# Patient Record
Sex: FEMALE | Race: BLACK OR AFRICAN AMERICAN | NOT HISPANIC OR LATINO | URBAN - METROPOLITAN AREA
[De-identification: names, ages, dates, MRNs, and addresses within clinical notes are randomized per-mention and may not be internally consistent; named-entity substitution may affect disease eponyms.]

---

## 2020-11-01 ENCOUNTER — INPATIENT (INPATIENT)
Facility: HOSPITAL | Age: 60
LOS: 1 days | Discharge: HOME | End: 2020-11-03
Attending: INTERNAL MEDICINE | Admitting: INTERNAL MEDICINE
Payer: MEDICAID

## 2020-11-01 VITALS
RESPIRATION RATE: 16 BRPM | HEIGHT: 63 IN | HEART RATE: 88 BPM | OXYGEN SATURATION: 95 % | SYSTOLIC BLOOD PRESSURE: 143 MMHG | WEIGHT: 203.05 LBS | DIASTOLIC BLOOD PRESSURE: 59 MMHG | TEMPERATURE: 98 F

## 2020-11-01 DIAGNOSIS — Z29.9 ENCOUNTER FOR PROPHYLACTIC MEASURES, UNSPECIFIED: ICD-10-CM

## 2020-11-01 DIAGNOSIS — I10 ESSENTIAL (PRIMARY) HYPERTENSION: ICD-10-CM

## 2020-11-01 DIAGNOSIS — I25.10 ATHEROSCLEROTIC HEART DISEASE OF NATIVE CORONARY ARTERY WITHOUT ANGINA PECTORIS: ICD-10-CM

## 2020-11-01 DIAGNOSIS — I27.82 CHRONIC PULMONARY EMBOLISM: ICD-10-CM

## 2020-11-01 DIAGNOSIS — R10.9 UNSPECIFIED ABDOMINAL PAIN: ICD-10-CM

## 2020-11-01 DIAGNOSIS — M32.9 SYSTEMIC LUPUS ERYTHEMATOSUS, UNSPECIFIED: ICD-10-CM

## 2020-11-01 DIAGNOSIS — Z98.89 OTHER SPECIFIED POSTPROCEDURAL STATES: Chronic | ICD-10-CM

## 2020-11-01 LAB
ALBUMIN SERPL ELPH-MCNC: 3.2 G/DL — LOW (ref 3.3–5)
ALP SERPL-CCNC: 86 U/L — SIGNIFICANT CHANGE UP (ref 40–120)
ALT FLD-CCNC: 27 U/L — SIGNIFICANT CHANGE UP (ref 12–78)
ANION GAP SERPL CALC-SCNC: 6 MMOL/L — SIGNIFICANT CHANGE UP (ref 5–17)
APTT BLD: 43.1 SEC — HIGH (ref 27.5–35.5)
AST SERPL-CCNC: 17 U/L — SIGNIFICANT CHANGE UP (ref 15–37)
BASOPHILS # BLD AUTO: 0.03 K/UL — SIGNIFICANT CHANGE UP (ref 0–0.2)
BASOPHILS NFR BLD AUTO: 0.4 % — SIGNIFICANT CHANGE UP (ref 0–2)
BILIRUB SERPL-MCNC: 0.2 MG/DL — SIGNIFICANT CHANGE UP (ref 0.2–1.2)
BUN SERPL-MCNC: 10 MG/DL — SIGNIFICANT CHANGE UP (ref 7–23)
CALCIUM SERPL-MCNC: 9 MG/DL — SIGNIFICANT CHANGE UP (ref 8.5–10.1)
CHLORIDE SERPL-SCNC: 109 MMOL/L — HIGH (ref 96–108)
CO2 SERPL-SCNC: 25 MMOL/L — SIGNIFICANT CHANGE UP (ref 22–31)
CREAT SERPL-MCNC: 0.91 MG/DL — SIGNIFICANT CHANGE UP (ref 0.5–1.3)
D DIMER BLD IA.RAPID-MCNC: 262 NG/ML DDU — HIGH
EOSINOPHIL # BLD AUTO: 0.17 K/UL — SIGNIFICANT CHANGE UP (ref 0–0.5)
EOSINOPHIL NFR BLD AUTO: 2 % — SIGNIFICANT CHANGE UP (ref 0–6)
GLUCOSE SERPL-MCNC: 174 MG/DL — HIGH (ref 70–99)
HCT VFR BLD CALC: 39.6 % — SIGNIFICANT CHANGE UP (ref 34.5–45)
HGB BLD-MCNC: 12.8 G/DL — SIGNIFICANT CHANGE UP (ref 11.5–15.5)
IMM GRANULOCYTES NFR BLD AUTO: 0.2 % — SIGNIFICANT CHANGE UP (ref 0–1.5)
INR BLD: 1.11 RATIO — SIGNIFICANT CHANGE UP (ref 0.88–1.16)
LIDOCAIN IGE QN: 90 U/L — SIGNIFICANT CHANGE UP (ref 73–393)
LYMPHOCYTES # BLD AUTO: 2.61 K/UL — SIGNIFICANT CHANGE UP (ref 1–3.3)
LYMPHOCYTES # BLD AUTO: 31.3 % — SIGNIFICANT CHANGE UP (ref 13–44)
MAGNESIUM SERPL-MCNC: 2.1 MG/DL — SIGNIFICANT CHANGE UP (ref 1.6–2.6)
MCHC RBC-ENTMCNC: 26.3 PG — LOW (ref 27–34)
MCHC RBC-ENTMCNC: 32.3 GM/DL — SIGNIFICANT CHANGE UP (ref 32–36)
MCV RBC AUTO: 81.5 FL — SIGNIFICANT CHANGE UP (ref 80–100)
MONOCYTES # BLD AUTO: 0.54 K/UL — SIGNIFICANT CHANGE UP (ref 0–0.9)
MONOCYTES NFR BLD AUTO: 6.5 % — SIGNIFICANT CHANGE UP (ref 2–14)
NEUTROPHILS # BLD AUTO: 4.97 K/UL — SIGNIFICANT CHANGE UP (ref 1.8–7.4)
NEUTROPHILS NFR BLD AUTO: 59.6 % — SIGNIFICANT CHANGE UP (ref 43–77)
NRBC # BLD: 0 /100 WBCS — SIGNIFICANT CHANGE UP (ref 0–0)
PLATELET # BLD AUTO: 280 K/UL — SIGNIFICANT CHANGE UP (ref 150–400)
POTASSIUM SERPL-MCNC: 4 MMOL/L — SIGNIFICANT CHANGE UP (ref 3.5–5.3)
POTASSIUM SERPL-SCNC: 4 MMOL/L — SIGNIFICANT CHANGE UP (ref 3.5–5.3)
PROT SERPL-MCNC: 7.6 GM/DL — SIGNIFICANT CHANGE UP (ref 6–8.3)
PROTHROM AB SERPL-ACNC: 12.8 SEC — SIGNIFICANT CHANGE UP (ref 10.6–13.6)
RBC # BLD: 4.86 M/UL — SIGNIFICANT CHANGE UP (ref 3.8–5.2)
RBC # FLD: 14.1 % — SIGNIFICANT CHANGE UP (ref 10.3–14.5)
SARS-COV-2 RNA SPEC QL NAA+PROBE: SIGNIFICANT CHANGE UP
SODIUM SERPL-SCNC: 140 MMOL/L — SIGNIFICANT CHANGE UP (ref 135–145)
WBC # BLD: 8.34 K/UL — SIGNIFICANT CHANGE UP (ref 3.8–10.5)
WBC # FLD AUTO: 8.34 K/UL — SIGNIFICANT CHANGE UP (ref 3.8–10.5)

## 2020-11-01 PROCEDURE — 99222 1ST HOSP IP/OBS MODERATE 55: CPT

## 2020-11-01 PROCEDURE — 71045 X-RAY EXAM CHEST 1 VIEW: CPT | Mod: 26

## 2020-11-01 PROCEDURE — 99285 EMERGENCY DEPT VISIT HI MDM: CPT

## 2020-11-01 PROCEDURE — 74177 CT ABD & PELVIS W/CONTRAST: CPT | Mod: 26,MA

## 2020-11-01 RX ORDER — ATORVASTATIN CALCIUM 80 MG/1
1 TABLET, FILM COATED ORAL
Qty: 0 | Refills: 0 | DISCHARGE

## 2020-11-01 RX ORDER — TRAMADOL HYDROCHLORIDE 50 MG/1
50 TABLET ORAL EVERY 6 HOURS
Refills: 0 | Status: DISCONTINUED | OUTPATIENT
Start: 2020-11-01 | End: 2020-11-03

## 2020-11-01 RX ORDER — MORPHINE SULFATE 50 MG/1
2 CAPSULE, EXTENDED RELEASE ORAL EVERY 4 HOURS
Refills: 0 | Status: DISCONTINUED | OUTPATIENT
Start: 2020-11-01 | End: 2020-11-03

## 2020-11-01 RX ORDER — SENNA PLUS 8.6 MG/1
1 TABLET ORAL
Qty: 0 | Refills: 0 | DISCHARGE

## 2020-11-01 RX ORDER — LISINOPRIL 2.5 MG/1
5 TABLET ORAL DAILY
Refills: 0 | Status: DISCONTINUED | OUTPATIENT
Start: 2020-11-01 | End: 2020-11-03

## 2020-11-01 RX ORDER — DORZOLAMIDE HYDROCHLORIDE TIMOLOL MALEATE 20; 5 MG/ML; MG/ML
1 SOLUTION/ DROPS OPHTHALMIC
Qty: 0 | Refills: 0 | DISCHARGE

## 2020-11-01 RX ORDER — METOPROLOL TARTRATE 50 MG
25 TABLET ORAL DAILY
Refills: 0 | Status: DISCONTINUED | OUTPATIENT
Start: 2020-11-01 | End: 2020-11-03

## 2020-11-01 RX ORDER — PANTOPRAZOLE SODIUM 20 MG/1
1 TABLET, DELAYED RELEASE ORAL
Qty: 0 | Refills: 0 | DISCHARGE

## 2020-11-01 RX ORDER — SODIUM CHLORIDE 9 MG/ML
1000 INJECTION INTRAMUSCULAR; INTRAVENOUS; SUBCUTANEOUS
Refills: 0 | Status: COMPLETED | OUTPATIENT
Start: 2020-11-01 | End: 2020-11-01

## 2020-11-01 RX ORDER — CLOPIDOGREL BISULFATE 75 MG/1
1 TABLET, FILM COATED ORAL
Qty: 0 | Refills: 0 | DISCHARGE

## 2020-11-01 RX ORDER — SODIUM CHLORIDE 9 MG/ML
1000 INJECTION INTRAMUSCULAR; INTRAVENOUS; SUBCUTANEOUS ONCE
Refills: 0 | Status: COMPLETED | OUTPATIENT
Start: 2020-11-01 | End: 2020-11-01

## 2020-11-01 RX ORDER — HYDROXYCHLOROQUINE SULFATE 200 MG
200 TABLET ORAL
Refills: 0 | Status: DISCONTINUED | OUTPATIENT
Start: 2020-11-01 | End: 2020-11-03

## 2020-11-01 RX ORDER — HYDROXYCHLOROQUINE SULFATE 200 MG
1 TABLET ORAL
Qty: 0 | Refills: 0 | DISCHARGE

## 2020-11-01 RX ORDER — HYDROXYUREA 500 MG/1
500 CAPSULE ORAL ONCE
Refills: 0 | Status: DISCONTINUED | OUTPATIENT
Start: 2020-11-01 | End: 2020-11-01

## 2020-11-01 RX ORDER — MYCOPHENOLATE MOFETIL 250 MG/1
500 CAPSULE ORAL
Refills: 0 | Status: DISCONTINUED | OUTPATIENT
Start: 2020-11-01 | End: 2020-11-01

## 2020-11-01 RX ORDER — ENOXAPARIN SODIUM 100 MG/ML
90 INJECTION SUBCUTANEOUS
Refills: 0 | Status: DISCONTINUED | OUTPATIENT
Start: 2020-11-01 | End: 2020-11-03

## 2020-11-01 RX ORDER — MORPHINE SULFATE 50 MG/1
6 CAPSULE, EXTENDED RELEASE ORAL ONCE
Refills: 0 | Status: DISCONTINUED | OUTPATIENT
Start: 2020-11-01 | End: 2020-11-01

## 2020-11-01 RX ORDER — ONDANSETRON 8 MG/1
4 TABLET, FILM COATED ORAL EVERY 6 HOURS
Refills: 0 | Status: DISCONTINUED | OUTPATIENT
Start: 2020-11-01 | End: 2020-11-03

## 2020-11-01 RX ORDER — CLOPIDOGREL BISULFATE 75 MG/1
75 TABLET, FILM COATED ORAL DAILY
Refills: 0 | Status: DISCONTINUED | OUTPATIENT
Start: 2020-11-01 | End: 2020-11-02

## 2020-11-01 RX ORDER — ACETAMINOPHEN 500 MG
2 TABLET ORAL
Qty: 0 | Refills: 0 | DISCHARGE

## 2020-11-01 RX ORDER — MYCOPHENOLATE MOFETIL 250 MG/1
2 CAPSULE ORAL
Qty: 0 | Refills: 0 | DISCHARGE

## 2020-11-01 RX ORDER — SODIUM CHLORIDE 9 MG/ML
1000 INJECTION INTRAMUSCULAR; INTRAVENOUS; SUBCUTANEOUS
Refills: 0 | Status: DISCONTINUED | OUTPATIENT
Start: 2020-11-01 | End: 2020-11-01

## 2020-11-01 RX ORDER — SENNA PLUS 8.6 MG/1
1 TABLET ORAL AT BEDTIME
Refills: 0 | Status: DISCONTINUED | OUTPATIENT
Start: 2020-11-01 | End: 2020-11-03

## 2020-11-01 RX ORDER — METOPROLOL TARTRATE 50 MG
25 TABLET ORAL
Qty: 0 | Refills: 0 | DISCHARGE

## 2020-11-01 RX ORDER — ONDANSETRON 8 MG/1
4 TABLET, FILM COATED ORAL ONCE
Refills: 0 | Status: COMPLETED | OUTPATIENT
Start: 2020-11-01 | End: 2020-11-01

## 2020-11-01 RX ORDER — ATORVASTATIN CALCIUM 80 MG/1
80 TABLET, FILM COATED ORAL AT BEDTIME
Refills: 0 | Status: DISCONTINUED | OUTPATIENT
Start: 2020-11-01 | End: 2020-11-03

## 2020-11-01 RX ORDER — LATANOPROST 0.05 MG/ML
1 SOLUTION/ DROPS OPHTHALMIC; TOPICAL AT BEDTIME
Refills: 0 | Status: DISCONTINUED | OUTPATIENT
Start: 2020-11-01 | End: 2020-11-03

## 2020-11-01 RX ORDER — POLYETHYLENE GLYCOL 3350 17 G/17G
17 POWDER, FOR SOLUTION ORAL DAILY
Refills: 0 | Status: DISCONTINUED | OUTPATIENT
Start: 2020-11-01 | End: 2020-11-03

## 2020-11-01 RX ORDER — PANTOPRAZOLE SODIUM 20 MG/1
40 TABLET, DELAYED RELEASE ORAL
Refills: 0 | Status: DISCONTINUED | OUTPATIENT
Start: 2020-11-01 | End: 2020-11-03

## 2020-11-01 RX ORDER — KETOROLAC TROMETHAMINE 30 MG/ML
15 SYRINGE (ML) INJECTION EVERY 8 HOURS
Refills: 0 | Status: DISCONTINUED | OUTPATIENT
Start: 2020-11-01 | End: 2020-11-01

## 2020-11-01 RX ORDER — ENOXAPARIN SODIUM 100 MG/ML
90 INJECTION SUBCUTANEOUS
Qty: 0 | Refills: 0 | DISCHARGE

## 2020-11-01 RX ORDER — DORZOLAMIDE HYDROCHLORIDE TIMOLOL MALEATE 20; 5 MG/ML; MG/ML
1 SOLUTION/ DROPS OPHTHALMIC
Refills: 0 | Status: DISCONTINUED | OUTPATIENT
Start: 2020-11-01 | End: 2020-11-03

## 2020-11-01 RX ORDER — LISINOPRIL 2.5 MG/1
1 TABLET ORAL
Qty: 0 | Refills: 0 | DISCHARGE

## 2020-11-01 RX ORDER — MYCOPHENOLATE MOFETIL 250 MG/1
1000 CAPSULE ORAL
Refills: 0 | Status: DISCONTINUED | OUTPATIENT
Start: 2020-11-01 | End: 2020-11-03

## 2020-11-01 RX ADMIN — SENNA PLUS 1 TABLET(S): 8.6 TABLET ORAL at 23:22

## 2020-11-01 RX ADMIN — MORPHINE SULFATE 6 MILLIGRAM(S): 50 CAPSULE, EXTENDED RELEASE ORAL at 17:54

## 2020-11-01 RX ADMIN — MORPHINE SULFATE 6 MILLIGRAM(S): 50 CAPSULE, EXTENDED RELEASE ORAL at 18:15

## 2020-11-01 RX ADMIN — SODIUM CHLORIDE 1000 MILLILITER(S): 9 INJECTION INTRAMUSCULAR; INTRAVENOUS; SUBCUTANEOUS at 18:54

## 2020-11-01 RX ADMIN — ATORVASTATIN CALCIUM 80 MILLIGRAM(S): 80 TABLET, FILM COATED ORAL at 23:21

## 2020-11-01 RX ADMIN — SODIUM CHLORIDE 1000 MILLILITER(S): 9 INJECTION INTRAMUSCULAR; INTRAVENOUS; SUBCUTANEOUS at 17:54

## 2020-11-01 RX ADMIN — Medication 10 MILLIGRAM(S): at 23:22

## 2020-11-01 RX ADMIN — ONDANSETRON 4 MILLIGRAM(S): 8 TABLET, FILM COATED ORAL at 17:54

## 2020-11-01 RX ADMIN — SODIUM CHLORIDE 75 MILLILITER(S): 9 INJECTION INTRAMUSCULAR; INTRAVENOUS; SUBCUTANEOUS at 23:22

## 2020-11-01 RX ADMIN — LATANOPROST 1 DROP(S): 0.05 SOLUTION/ DROPS OPHTHALMIC; TOPICAL at 23:22

## 2020-11-01 NOTE — H&P ADULT - NSHPPHYSICALEXAM_GEN_ALL_CORE
PHYSICAL EXAM:    Vital Signs Last 24 Hrs  T(C): 36.6 (01 Nov 2020 17:28), Max: 36.6 (01 Nov 2020 17:28)  T(F): 97.9 (01 Nov 2020 17:28), Max: 97.9 (01 Nov 2020 17:28)  HR: 72 (01 Nov 2020 20:37) (72 - 88)  BP: 117/56 (01 Nov 2020 20:37) (117/56 - 143/59)  BP(mean): --  RR: 16 (01 Nov 2020 20:37) (16 - 16)  SpO2: 100% (01 Nov 2020 20:37) (95% - 100%)    GENERAL: Pt lying in bed comfortably in NAD  HEENT:  Atraumatic, EOMI, PERRL, conjunctiva and sclera clear, MMM  NECK: Supple, No JVD  CHEST/LUNG: Clear to auscultation bilaterally; No rales, rhonchi, wheezing or rubs. Unlabored respirations  HEART: Regular rate and rhythm; No murmurs, rubs, or gallops  ABDOMEN: Bowel sounds present; Soft, tender in the left abdomen, mildly distended.     EXTREMITIES:  2+ Peripheral Pulses, brisk capillary refill. No edema  NEUROLOGICAL:  Alert & Oriented X3, speech clear. Answers questions appropriately. Full and equal strength B/L upper and lower extremities. No deficits   MSK: FROM x 4 extremities   SKIN: No rashes or lesions

## 2020-11-01 NOTE — H&P ADULT - NSICDXPASTMEDICALHX_GEN_ALL_CORE_FT
PAST MEDICAL HISTORY:  Breast cancer     Coronary artery disease     DM (diabetes mellitus)     Hypertension     Lupus

## 2020-11-01 NOTE — H&P ADULT - PROBLEM SELECTOR PLAN 1
- although chronic, has been more severe and intractable  - pain control  - keep NPO for now, as pain exacerbation by food  - IVF  - CT scan w/ possible SVC thrombus, f/u MRI w/ IV contrast  - c/w PPI and bentyl  - IV morphine prn w/ continued laxatives - although chronic, has been more severe and intractable  - pain control  - keep NPO for now, as pain exacerbation by food, start clears in am and adv as tolerated  - IVF  - CT scan w/ possible SVC or central venous thrombus, f/u MRI w/ IV contrast in am  - c/w PPI and bentyl  - IV morphine prn w/ continued laxatives

## 2020-11-01 NOTE — H&P ADULT - NSHPLABSRESULTS_GEN_ALL_CORE
T(C): 36.6 (11-01-20 @ 17:28), Max: 36.6 (11-01-20 @ 17:28)  HR: 72 (11-01-20 @ 20:37) (72 - 88)  BP: 117/56 (11-01-20 @ 20:37) (117/56 - 143/59)  RR: 16 (11-01-20 @ 20:37) (16 - 16)  SpO2: 100% (11-01-20 @ 20:37) (95% - 100%)                        12.8   8.34  )-----------( 280      ( 01 Nov 2020 17:49 )             39.6     11-01    140  |  109<H>  |  10  ----------------------------<  174<H>  4.0   |  25  |  0.91    Ca    9.0      01 Nov 2020 17:49  Mg     2.1     11-01    TPro  7.6  /  Alb  3.2<L>  /  TBili  0.2  /  DBili  x   /  AST  17  /  ALT  27  /  AlkPhos  86  11-01    LIVER FUNCTIONS - ( 01 Nov 2020 17:49 )  Alb: 3.2 g/dL / Pro: 7.6 gm/dL / ALK PHOS: 86 U/L / ALT: 27 U/L / AST: 17 U/L / GGT: x           PT/INR - ( 01 Nov 2020 17:49 )   PT: 12.8 sec;   INR: 1.11 ratio         PTT - ( 01 Nov 2020 17:49 )  PTT:43.1 sec    < from: CT Abdomen and Pelvis w/ IV Cont (11.01.20 @ 18:34) >    IMPRESSION:  1.  Dilated azygos vein with extensive varices in the anterior abdominal wall may be secondary to SVC or central venous thrombosis, especially in this patient with history of breast cancer.. Consider nonemergent CT venogram of the chest for further evaluation.  2.  Fat-containing periumbilical hernia.  3.  Focal hepatic steatosis in segment 4 of the liver.    < end of copied text >    EKG - NSR at 76 bpm, no acute ischemic changes    ketorolac   Injectable 15 milliGRAM(s) IV Push every 8 hours PRN  morphine  - Injectable 2 milliGRAM(s) IV Push every 4 hours PRN  ondansetron Injectable 4 milliGRAM(s) IV Push every 6 hours PRN

## 2020-11-01 NOTE — H&P ADULT - HISTORY OF PRESENT ILLNESS
61 y/o female with PMHx of Lupus, HLD, CAD s/p MI w/ PCI and stent placement, Left Breast CA s/p Lumpectomy and Chemo 4 yrs ago, Recurrent PE on Lovenox subq presents to the ED c/o intermittent LUQ abdominal pain for the past few years. Pt reports pain initial began four years ago after her lumpectomy and has occurred intermittently since then. Pt reports pain has been intermittent in the past however in the last few weeks have been more constant. Pt reports pain at times worse after eating however can be worse without food. Pain located mainly in LUQ however does radiate down to LLQ and up her left chest to her shoulders. Pt describes pain as sharp, burning and 10/10 at max. Pt also reports constipation, abdominal bloating but denies n/v. Pt denies dysuria or urinary frequency, fever or chills. Pt reports multiple hospitalizations in past and extensive work up including scans as well as a colonoscopy were all unremarkable. Pt reports she was again referred to GI recently and is scheduled for an EGD on Thursday.     In ED Vitals stable, labs stable CT A/P w/ IV contrast Dilated azygos vein with extensive varices in the anterior abdominal wall may be secondary to SVC or central venous thrombosis, especially in this patient with history of breast cancer.

## 2020-11-01 NOTE — ED PROVIDER NOTE - OBJECTIVE STATEMENT
59 y/o F with pmhx lupus, HLD, hx of MI, breast CA, prediabetic presents c/o intermittent LUQ abdominal pain for past few years. Pt has been to multiple hospitals in past and they have not found a cause but she is scheduled for a EGD on Thursday. Pt says that today's episode happened after she was seated on toilet trying to pass a hard bowel movement. She denies N/V/D, CP, SOB, urinary symptoms, fevers.

## 2020-11-01 NOTE — ED PROVIDER NOTE - CLINICAL SUMMARY MEDICAL DECISION MAKING FREE TEXT BOX
severe abdominal pain r/o constipation vs diverticulitis vs SBO. severe abdominal pain r/o constipation vs diverticulitis vs SBO. ct shows possible svc or thrombosis. admit for pain control and mr of abd.

## 2020-11-01 NOTE — ED ADULT NURSE NOTE - OBJECTIVE STATEMENT
A&Ox4, ambulating. pt c/o left side abdominal pain x1 day, pain aggravated by having bowel movement today, sharp, pain 10/10, sudden onset with nausea, constant. pt denies falls/injuries. pt denies V/sob/cp/headache/dizziness. pt stated last BM was today and x1 yesterday, normal.

## 2020-11-01 NOTE — ED ADULT TRIAGE NOTE - CHIEF COMPLAINT QUOTE
59 y/o female with PMH OF HTN, GERD, STENT (2019), HLD, GLAUCOMA, SLE, INTERSTITIAL LUNG DISEASE, SLEEP APNEA. BIBA with c/c of abdominal pain that came on suddenly with nausea.

## 2020-11-01 NOTE — ED ADULT NURSE NOTE - CHIEF COMPLAINT QUOTE
61 y/o female with PMH OF HTN, GERD, STENT (2019), HLD, GLAUCOMA, SLE, INTERSTITIAL LUNG DISEASE, SLEEP APNEA. BIBA with c/c of abdominal pain that came on suddenly with nausea.

## 2020-11-01 NOTE — ED PROVIDER NOTE - PHYSICAL EXAMINATION
Gen: Alert, Pt is moaning and in agony.  Head: NC, AT   Eyes: PERRL, EOMI, normal lids/conjunctiva  ENT: normal hearing, patent oropharynx without erythema/exudate, uvula midline  Neck: supple, no tenderness, Trachea midline  Pulm: Bilateral BS, normal resp effort, no wheeze/stridor/retractions  CV: RRR, no M/R/G, 2+ radial and dp pulses bl, no edema  Abd: + obese abdomen, soft, NT/ND, +BS, no hepatosplenomegaly  Mskel: extremities x4 with normal ROM and no joint effusions. no ctl spine ttp.   Skin: no rash, no bruising   Neuro: AAOx3, no sensory/motor deficits, CN 2-12 intact

## 2020-11-01 NOTE — H&P ADULT - ASSESSMENT
59 y/o female with PMHx of Lupus, HLD, CAD s/p MI w/ PCI and stent placement, Left Breast CA s/p Lumpectomy and Chemo 4 yrs ago, Recurrent PE on Lovenox subq presents to the ED c/o intermittent LUQ abdominal pain for the past few years. Pt being admitted for intractable abdominal pain.    IMPROVE VTE Individual Risk Assessment    RISK                                                                Points    [  ] Previous VTE                                                  3    [  ] Thrombophilia                                               2    [  ] Lower limb paralysis                                      2        (unable to hold up >15 seconds)      [  ] Current Cancer                                              2         (within 6 months)    [  ] Immobilization > 24 hrs                                1    [  ] ICU/CCU stay > 24 hours                              1    [  ] Age > 60                                                      1    IMPROVE VTE Score ___3______    IMPROVE Score 0-1: Low Risk, No VTE prophylaxis required for most patients, encourage ambulation.   IMPROVE Score 2-3: At risk, pharmacologic VTE prophylaxis is indicated for most patients (in the absence of a contraindication)  IMPROVE Score > or = 4: High Risk, pharmacologic VTE prophylaxis is indicated for most patients (in the absence of a contraindication)

## 2020-11-01 NOTE — H&P ADULT - PROBLEM SELECTOR PLAN 2
- Pt reports she was previously on Coumadin for 10 yrs, then developed another clot 2 yrs ago thus was placed in Lovenox subq which has been on since then

## 2020-11-02 DIAGNOSIS — I87.1 COMPRESSION OF VEIN: ICD-10-CM

## 2020-11-02 LAB
HCV AB S/CO SERPL IA: 0.1 S/CO — SIGNIFICANT CHANGE UP (ref 0–0.99)
HCV AB SERPL-IMP: SIGNIFICANT CHANGE UP

## 2020-11-02 PROCEDURE — 99233 SBSQ HOSP IP/OBS HIGH 50: CPT

## 2020-11-02 PROCEDURE — 74183 MRI ABD W/O CNTR FLWD CNTR: CPT | Mod: 26

## 2020-11-02 RX ORDER — SIMETHICONE 80 MG/1
80 TABLET, CHEWABLE ORAL
Refills: 0 | Status: DISCONTINUED | OUTPATIENT
Start: 2020-11-02 | End: 2020-11-03

## 2020-11-02 RX ADMIN — Medication 10 MILLIGRAM(S): at 21:11

## 2020-11-02 RX ADMIN — ENOXAPARIN SODIUM 90 MILLIGRAM(S): 100 INJECTION SUBCUTANEOUS at 05:59

## 2020-11-02 RX ADMIN — Medication 1 DROP(S): at 23:30

## 2020-11-02 RX ADMIN — TRAMADOL HYDROCHLORIDE 50 MILLIGRAM(S): 50 TABLET ORAL at 21:15

## 2020-11-02 RX ADMIN — Medication 200 MILLIGRAM(S): at 06:00

## 2020-11-02 RX ADMIN — ENOXAPARIN SODIUM 90 MILLIGRAM(S): 100 INJECTION SUBCUTANEOUS at 17:27

## 2020-11-02 RX ADMIN — ATORVASTATIN CALCIUM 80 MILLIGRAM(S): 80 TABLET, FILM COATED ORAL at 21:10

## 2020-11-02 RX ADMIN — CLOPIDOGREL BISULFATE 75 MILLIGRAM(S): 75 TABLET, FILM COATED ORAL at 11:48

## 2020-11-02 RX ADMIN — MYCOPHENOLATE MOFETIL 1000 MILLIGRAM(S): 250 CAPSULE ORAL at 17:28

## 2020-11-02 RX ADMIN — DORZOLAMIDE HYDROCHLORIDE TIMOLOL MALEATE 1 DROP(S): 20; 5 SOLUTION/ DROPS OPHTHALMIC at 05:58

## 2020-11-02 RX ADMIN — Medication 10 MILLIGRAM(S): at 16:12

## 2020-11-02 RX ADMIN — DORZOLAMIDE HYDROCHLORIDE TIMOLOL MALEATE 1 DROP(S): 20; 5 SOLUTION/ DROPS OPHTHALMIC at 17:26

## 2020-11-02 RX ADMIN — Medication 200 MILLIGRAM(S): at 17:27

## 2020-11-02 RX ADMIN — TRAMADOL HYDROCHLORIDE 50 MILLIGRAM(S): 50 TABLET ORAL at 22:15

## 2020-11-02 RX ADMIN — MYCOPHENOLATE MOFETIL 1000 MILLIGRAM(S): 250 CAPSULE ORAL at 06:00

## 2020-11-02 RX ADMIN — Medication 25 MILLIGRAM(S): at 06:00

## 2020-11-02 RX ADMIN — TRAMADOL HYDROCHLORIDE 50 MILLIGRAM(S): 50 TABLET ORAL at 11:47

## 2020-11-02 RX ADMIN — LATANOPROST 1 DROP(S): 0.05 SOLUTION/ DROPS OPHTHALMIC; TOPICAL at 21:10

## 2020-11-02 RX ADMIN — SENNA PLUS 1 TABLET(S): 8.6 TABLET ORAL at 21:11

## 2020-11-02 RX ADMIN — Medication 10 MILLIGRAM(S): at 11:47

## 2020-11-02 RX ADMIN — TRAMADOL HYDROCHLORIDE 50 MILLIGRAM(S): 50 TABLET ORAL at 12:47

## 2020-11-02 RX ADMIN — Medication 1 DROP(S): at 06:01

## 2020-11-02 RX ADMIN — SIMETHICONE 80 MILLIGRAM(S): 80 TABLET, CHEWABLE ORAL at 17:28

## 2020-11-02 RX ADMIN — Medication 10 MILLIGRAM(S): at 08:10

## 2020-11-02 RX ADMIN — POLYETHYLENE GLYCOL 3350 17 GRAM(S): 17 POWDER, FOR SOLUTION ORAL at 11:48

## 2020-11-02 RX ADMIN — Medication 1 DROP(S): at 00:22

## 2020-11-02 RX ADMIN — LISINOPRIL 5 MILLIGRAM(S): 2.5 TABLET ORAL at 06:00

## 2020-11-02 RX ADMIN — Medication 1 DROP(S): at 17:26

## 2020-11-02 RX ADMIN — Medication 1 DROP(S): at 11:49

## 2020-11-02 RX ADMIN — PANTOPRAZOLE SODIUM 40 MILLIGRAM(S): 20 TABLET, DELAYED RELEASE ORAL at 08:10

## 2020-11-02 NOTE — CONSULT NOTE ADULT - SUBJECTIVE AND OBJECTIVE BOX
HPI:       59 y/o female with PMHx of Lupus, HLD, CAD s/p MI w/ PCI and stent placement, Left Breast CA s/p Lumpectomy and Chemo 4 yrs ago, Recurrent PE on Lovenox subq presents to the ED c/o intermittent LUQ abdominal pain for the past few years. Pt reports pain initial began four years ago after her lumpectomy and has occurred intermittently since then. Pt reports pain has been intermittent in the past however in the last few weeks have been more constant. Pt reports pain at times worse after eating however can be worse without food. Pain located mainly in LUQ however does radiate down to LLQ and up her left chest to her shoulders. Pt describes pain as sharp, burning and 10/10 at max. Pt also reports constipation, abdominal bloating but denies n/v. Pt denies dysuria or urinary frequency, fever or chills. Pt reports multiple hospitalizations in past and extensive work up including scans as well as a colonoscopy were all unremarkable. Pt reports she was again referred to GI recently and is scheduled for an EGD on Thursday.     In ED Vitals stable, labs stable CT A/P w/ IV contrast Dilated azygos vein with extensive varices in the anterior abdominal wall may be secondary to SVC or central venous thrombosis, especially in this patient with history of breast cancer. (01 Nov 2020 20:57)  ------------------ As Above -----------------------------------------------------------------  The patient presents with a LUQ pain for a very long time. The pain is best described as gassy / bloated. It seems that the food just sits there. It happens immediately after eating. The pain can also present without meals. Sitting up straight from a prone position or bending toward her left side causes the same pain. Finally, the pain sometimes radiates around from her left back  to her left upper quadrant. The patient states that despite different medications, she still has the pain. The pain can be constant and last for hours. It has not changed over the past few months. Constipated (+). She is on no food restrictions and does take lactose products. The patient had a " negative"  EGD and colonoscopy three years ago. The patient is scheduled for an EGD this Thursday. ( The patient is presently on Plavix and Lovenox BID )   See labs / CT scan    PAST MEDICAL & SURGICAL HISTORY:  Coronary artery disease    Hypertension    Lupus    Breast cancer    DM (diabetes mellitus)    H/O breast surgery        MEDICATIONS  (STANDING):  artificial tears (preservative free) Ophthalmic Solution 1 Drop(s) Both EYES four times a day  atorvastatin 80 milliGRAM(s) Oral at bedtime  clopidogrel Tablet 75 milliGRAM(s) Oral daily  dicyclomine 10 milliGRAM(s) Oral four times a day before meals  dorzolamide 2%/timolol 0.5% Ophthalmic Solution 1 Drop(s) Both EYES two times a day  enoxaparin Injectable 90 milliGRAM(s) SubCutaneous two times a day  hydroxychloroquine 200 milliGRAM(s) Oral two times a day  latanoprost 0.005% Ophthalmic Solution 1 Drop(s) Both EYES at bedtime  lisinopril 5 milliGRAM(s) Oral daily  metoprolol tartrate 25 milliGRAM(s) Oral daily  mycophenolate mofetil 1000 milliGRAM(s) Oral two times a day  pantoprazole    Tablet 40 milliGRAM(s) Oral before breakfast  polyethylene glycol 3350 17 Gram(s) Oral daily  senna 1 Tablet(s) Oral at bedtime    MEDICATIONS  (PRN):  morphine  - Injectable 2 milliGRAM(s) IV Push every 4 hours PRN Severe Pain (7 - 10)  ondansetron Injectable 4 milliGRAM(s) IV Push every 6 hours PRN Nausea and/or Vomiting  traMADol 50 milliGRAM(s) Oral every 6 hours PRN Moderate Pain (4 - 6)      Allergies    No Known Allergies    Intolerances        FAMILY HISTORY:  No pertinent family history in first degree relatives        REVIEW OF SYSTEMS:    CONSTITUTIONAL: No fever, weight loss, or fatigue  EYES: No eye pain, visual disturbances, or discharge  ENMT:  No difficulty hearing, tinnitus, vertigo; No sinus or throat pain  NECK: No pain or stiffness  BREASTS: No pain, masses, or nipple discharge  RESPIRATORY: No cough, wheezing, chills or hemoptysis; No shortness of breath  CARDIOVASCULAR: No chest pain, palpitations, dizziness, or leg swelling  GASTROINTESTINAL: See above  GENITOURINARY: No dysuria, frequency, hematuria, or incontinence  NEUROLOGICAL: No headaches, memory loss, loss of strength, numbness, or tremors  SKIN: No itching, burning, rashes, or lesions   LYMPH NODES: No enlarged glands  ENDOCRINE: No heat or cold intolerance; No hair loss  MUSCULOSKELETAL: No joint pain or swelling; No muscle, back, or extremity pain  PSYCHIATRIC: No depression, anxiety, mood swings, or difficulty sleeping  HEME/LYMPH: No easy bruising, or bleeding gums  ALLERGY AND IMMUNOLOGIC: No hives or eczema          SOCIAL HISTORY:    FAMILY HISTORY:  No pertinent family history in first degree relatives        Vital Signs Last 24 Hrs  T(C): 37.1 (02 Nov 2020 11:01), Max: 37.1 (02 Nov 2020 11:01)  T(F): 98.8 (02 Nov 2020 11:01), Max: 98.8 (02 Nov 2020 11:01)  HR: 67 (02 Nov 2020 11:01) (66 - 88)  BP: 143/73 (02 Nov 2020 11:01) (117/56 - 148/63)  BP(mean): --  RR: 18 (02 Nov 2020 11:01) (16 - 18)  SpO2: 99% (02 Nov 2020 11:01) (95% - 100%)    PHYSICAL EXAM:    GENERAL: NAD, well-groomed, well-developed  HEAD:  Atraumatic, Normocephalic  EYES: EOMI, PERRLA, conjunctiva and sclera clear  NECK: Supple, No JVD, Normal thyroid  NERVOUS SYSTEM:  Alert & Oriented X3, Good concentration; Motor Strength 5/5 B/L upper and lower extremities;   CHEST/LUNG: Clear to percussion bilaterally; No rales, rhonchi, wheezing, or rubs  HEART: Regular rate and rhythm; No murmurs, rubs, or gallops  ABDOMEN: Soft, ? tenderness on deep palpation, Nondistended; Bowel sounds present  EXTREMITIES:  2+ Peripheral Pulses, No clubbing, cyanosis, or edema  LYMPH: No lymphadenopathy noted   RECTAL: Deferred   SKIN: No rashes or lesions    LABS:                        12.8   8.34  )-----------( 280      ( 01 Nov 2020 17:49 )             39.6       CBC:  11-01 @ 17:49  WBC  8.34  HGB 12.8  HCT 39.6 Plate 280  MCV 81.5           01 Nov 2020 17:49    140    |  109    |  10     ----------------------------<  174    4.0     |  25     |  0.91     Ca    9.0        01 Nov 2020 17:49  Mg     2.1       01 Nov 2020 17:49    TPro  7.6    /  Alb  3.2    /  TBili  0.2    /  DBili  x      /  AST  17     /  ALT  27     /  AlkPhos  86     01 Nov 2020 17:49    PT/INR - ( 01 Nov 2020 17:49 )   PT: 12.8 sec;   INR: 1.11 ratio         PTT - ( 01 Nov 2020 17:49 )  PTT:43.1 sec        RADIOLOGY & ADDITIONAL STUDIES:  < from: CT Abdomen and Pelvis w/ IV Cont (11.01.20 @ 18:34) >    EXAM:  CT ABDOMEN AND PELVIS IC                            PROCEDURE DATE:  11/01/2020          INTERPRETATION:  CLINICAL INFORMATION: Severe abdominal pain    COMPARISON: April 12, 2016    PROCEDURE:  CT of the Abdomen and Pelvis was performed with intravenous contrast.  Intravenous contrast: 90 ml Omnipaque 350. 10 ml discarded.  Oral contrast: None.  Sagittal and coronal reformats were performed.    FINDINGS:  LOWER CHEST: Areas of groundglass opacity slightly more prominent than was seen onthe prior study are seen at both lung bases. Scarring is seen in the periphery of the RIGHT middle lobe with air cyst formation. Paraseptal emphysema is seen adjacent to the mediastinum on the LEFT.    LIVER: Asymmetric focal hepatic steatosis in segment 4 of the liver. This is similar to the prior study.  Prominent portal vein with recanalized periumbilical vein. Periumbilical subcutaneous varices throughout the anterior abdominal wall.  BILE DUCTS: Normal caliber.  GALLBLADDER: Within normal limits.  SPLEEN: Within normal limits.  PANCREAS: Within normal limits.  ADRENALS: Within normal limits.  KIDNEYS/URETERS: Within normal limits.    BLADDER: Within normal limits.  REPRODUCTIVE ORGANS: Normal uterus and adnexa.    BOWEL: No bowel obstruction. Appendix is normal.  Distended stomach with heterogeneous contents.  PERITONEUM: No ascites.  VESSELS: Dilated azygos vein in the lower chest and upper abdomen from the renal veins cephalad.  RETROPERITONEUM/LYMPH NODES: No lymphadenopathy.  ABDOMINAL WALL: Fat-containing periumbilical hernia. Numerous varices in the anterior abdominal wall.  BONES: Within normal limits.    IMPRESSION:  1.  Dilated azygos vein with extensive varices in the anterior abdominal wall may be secondary to SVC or central venous thrombosis, especially in this patient with history of breast cancer.. Consider nonemergent CT venogram of the chest for further evaluation.  2.  Fat-containing periumbilical hernia.  3.  Focal hepatic steatosis in segment 4 of the liver.            JOSE WILLIS MD; Attending Radiologist  This document has been electronically signed. Nov 1 2020  6:46PM    < end of copied text >

## 2020-11-02 NOTE — PROGRESS NOTE ADULT - PROBLEM SELECTOR PLAN 2
- Pt reports she was previously on Coumadin for 10 yrs, then developed another clot 2 yrs ago thus was placed in Lovenox subq which has been on since then   will continue

## 2020-11-02 NOTE — PATIENT PROFILE ADULT - VISION (WITH CORRECTIVE LENSES IF THE PATIENT USUALLY WEARS THEM):
pt has glaucoma/Partially impaired: cannot see medication labels or newsprint, but can see obstacles in path, and the surrounding layout; can count fingers at arm's length

## 2020-11-02 NOTE — CONSULT NOTE ADULT - ASSESSMENT
HPI:       59 y/o female with PMHx of Lupus, HLD, CAD s/p MI w/ PCI and stent placement, Left Breast CA s/p Lumpectomy and Chemo 4 yrs ago, Recurrent PE on Lovenox subq presents to the ED c/o intermittent LUQ abdominal pain for the past few years. Pt reports pain initial began four years ago after her lumpectomy and has occurred intermittently since then. Pt reports pain has been intermittent in the past however in the last few weeks have been more constant. Pt reports pain at times worse after eating however can be worse without food. Pain located mainly in LUQ however does radiate down to LLQ and up her left chest to her shoulders. Pt describes pain as sharp, burning and 10/10 at max. Pt also reports constipation, abdominal bloating but denies n/v. Pt denies dysuria or urinary frequency, fever or chills. Pt reports multiple hospitalizations in past and extensive work up including scans as well as a colonoscopy were all unremarkable. Pt reports she was again referred to GI recently and is scheduled for an EGD on Thursday.     In ED Vitals stable, labs stable CT A/P w/ IV contrast Dilated azygos vein with extensive varices in the anterior abdominal wall may be secondary to SVC or central venous thrombosis, especially in this patient with history of breast cancer. (01 Nov 2020 20:57)  ------------------ As Above -----------------------------------------------------------------  The patient presents with a LUQ pain for a very long time. The pain is best described as gassy / bloated. It seems that the food just sits there. It happens immediately after eating. The pain can also present without meals. Sitting up straight from a prone position or bending toward her left side causes the same pain. Finally, the pain sometimes radiates around from her left back  to her left upper quadrant. The patient states that despite different medications, she still has the pain. The pain can be constant and last for hours. It has not changed over the past few months. Constipated (+). She is on no food restrictions and does take lactose products. The patient had a " negative"  EGD and colonoscopy three years ago. The patient is scheduled for an EGD this Thursday. ( The patient is presently on Plavix and Lovenox BID )   See labs / CT scan    Abdominal pain - Probably has components of IBS and muscular skeletal origin. 1) EGD Thursday ( Can be [facility] as out patient - Hold Plavix if possible ) 2) Low fiber diet  3) lactose free diet 4) Simethicone 5) Pain management consult 6) ? PPI HPI:       59 y/o female with PMHx of Lupus, HLD, CAD s/p MI w/ PCI and stent placement, Left Breast CA s/p Lumpectomy and Chemo 4 yrs ago, Recurrent PE on Lovenox subq presents to the ED c/o intermittent LUQ abdominal pain for the past few years. Pt reports pain initial began four years ago after her lumpectomy and has occurred intermittently since then. Pt reports pain has been intermittent in the past however in the last few weeks have been more constant. Pt reports pain at times worse after eating however can be worse without food. Pain located mainly in LUQ however does radiate down to LLQ and up her left chest to her shoulders. Pt describes pain as sharp, burning and 10/10 at max. Pt also reports constipation, abdominal bloating but denies n/v. Pt denies dysuria or urinary frequency, fever or chills. Pt reports multiple hospitalizations in past and extensive work up including scans as well as a colonoscopy were all unremarkable. Pt reports she was again referred to GI recently and is scheduled for an EGD on Thursday.     In ED Vitals stable, labs stable CT A/P w/ IV contrast Dilated azygos vein with extensive varices in the anterior abdominal wall may be secondary to SVC or central venous thrombosis, especially in this patient with history of breast cancer. (01 Nov 2020 20:57)  ------------------ As Above -----------------------------------------------------------------  The patient presents with a LUQ pain for a very long time. The pain is best described as gassy / bloated. It seems that the food just sits there. It happens immediately after eating. The pain can also present without meals. Sitting up straight from a prone position or bending toward her left side causes the same pain. Finally, the pain sometimes radiates around from her left back  to her left upper quadrant. The patient states that despite different medications, she still has the pain. The pain can be constant and last for hours. It has not changed over the past few months. Constipated (+). She is on no food restrictions and does take lactose products. The patient had a " negative"  EGD and colonoscopy three years ago. The patient is scheduled for an EGD this Thursday. ( The patient is presently on Plavix and Lovenox BID )   See labs / CT scan    Abdominal pain - Probably has components of IBS and muscular skeletal origin. 1) EGD Thursday ( Can be [facility] as out patient - Hold Plavix if possible ) 2) Low fiber diet  3) lactose free diet 4) Simethicone 5) Pain management consult 6) ? PPI 7) clear liquid diet

## 2020-11-02 NOTE — PROGRESS NOTE ADULT - SUBJECTIVE AND OBJECTIVE BOX
Patient is a 60y old  Female who presents with a chief complaint of Intractable abdominal pain (01 Nov 2020 20:57)      OVERNIGHT EVENTS:      REVIEW OF SYSTEMS: denies chest pain/SOB, diaphoresis, no F/C, cough, dizziness, headache, blurry vision, nausea, vomiting, abdominal pain. Rest unremarkable     MEDICATIONS  (STANDING):  artificial tears (preservative free) Ophthalmic Solution 1 Drop(s) Both EYES four times a day  atorvastatin 80 milliGRAM(s) Oral at bedtime  clopidogrel Tablet 75 milliGRAM(s) Oral daily  dicyclomine 10 milliGRAM(s) Oral four times a day before meals  dorzolamide 2%/timolol 0.5% Ophthalmic Solution 1 Drop(s) Both EYES two times a day  enoxaparin Injectable 90 milliGRAM(s) SubCutaneous two times a day  hydroxychloroquine 200 milliGRAM(s) Oral two times a day  latanoprost 0.005% Ophthalmic Solution 1 Drop(s) Both EYES at bedtime  lisinopril 5 milliGRAM(s) Oral daily  metoprolol tartrate 25 milliGRAM(s) Oral daily  mycophenolate mofetil 1000 milliGRAM(s) Oral two times a day  pantoprazole    Tablet 40 milliGRAM(s) Oral before breakfast  polyethylene glycol 3350 17 Gram(s) Oral daily  senna 1 Tablet(s) Oral at bedtime    MEDICATIONS  (PRN):  morphine  - Injectable 2 milliGRAM(s) IV Push every 4 hours PRN Severe Pain (7 - 10)  ondansetron Injectable 4 milliGRAM(s) IV Push every 6 hours PRN Nausea and/or Vomiting  traMADol 50 milliGRAM(s) Oral every 6 hours PRN Moderate Pain (4 - 6)      Allergies    No Known Allergies    Intolerances        SUBJECTIVE: in bed in NAD, no acute events overnight     T(F): 98.2 (11-02-20 @ 06:18), Max: 98.3 (11-02-20 @ 00:27)  HR: 66 (11-02-20 @ 06:18) (66 - 88)  BP: 128/68 (11-02-20 @ 06:18) (117/56 - 148/63)  RR: 18 (11-02-20 @ 06:18) (16 - 18)  SpO2: 99% (11-02-20 @ 06:18) (95% - 100%)  Wt(kg): --    PHYSICAL EXAM:  GENERAL: NAD, well-groomed, well-developed  HEAD:  Atraumatic, Normocephalic  EYES: EOMI, PERRLA, conjunctiva and sclera clear  ENMT: No tonsillar erythema, exudates, or enlargement; Moist mucous membranes, Good dentition, No lesions  NECK: Supple, No JVD, Normal thyroid  CHEST/LUNG: Clear to  auscultation bilaterally; No rales, rhonchi, wheezing, or rubs  bilaterally  HEART: Regular rate and rhythm; No murmurs, rubs, or gallops  ABDOMEN: Soft, Nontender, Nondistended; Bowel sounds present  EXTREMITIES:  2+ Peripheral Pulses, No clubbing, cyanosis, or edema BL LE  LYMPH: No lymphadenopathy noted  SKIN: No rashes or lesions  NERVOUS SYSTEM:  Alert & Oriented X3, Good concentration; Motor Strength 5/5 B/L upper and lower extremities;   DTRs 2+ intact and symmetric, sensation intact BL    LABS:                        12.8   8.34  )-----------( 280      ( 01 Nov 2020 17:49 )             39.6     11-01    140  |  109<H>  |  10  ----------------------------<  174<H>  4.0   |  25  |  0.91    Ca    9.0      01 Nov 2020 17:49  Mg     2.1     11-01    TPro  7.6  /  Alb  3.2<L>  /  TBili  0.2  /  DBili  x   /  AST  17  /  ALT  27  /  AlkPhos  86  11-01    PT/INR - ( 01 Nov 2020 17:49 )   PT: 12.8 sec;   INR: 1.11 ratio         PTT - ( 01 Nov 2020 17:49 )  PTT:43.1 sec    Cultures;   CAPILLARY BLOOD GLUCOSE        Lipid panel:           RADIOLOGY & ADDITIONAL TESTS:      Imaging Personally Reviewed:  [ x] YES      Consultant(s) Notes Reviewed:  [x ] YES     Care Discussed with [x ] Consultants [X ] Patient [x ] Family  [x ]    [x ]  Other; RN

## 2020-11-02 NOTE — PROGRESS NOTE ADULT - PROBLEM SELECTOR PLAN 1
- although chronic, has been more severe and intractable recently per patient  - pain control  - keep NPO for now,  will get GI evaluation CT scan shows heterogenous content in stomach could be food blood or ?  PPI   - c/w PPI and bentyl  - IV morphine prn w/ continued laxatives

## 2020-11-02 NOTE — PROGRESS NOTE ADULT - PROBLEM SELECTOR PLAN 7
?svc obstruction given abnormal azgos with collaterals noted on ct scan . margarita get vascula consult .   unclear what more to be done as pt already on therapeutic doses of Lovenox for h/o PE

## 2020-11-02 NOTE — PROGRESS NOTE ADULT - ASSESSMENT
61 y/o female with PMHx of Lupus, HLD, CAD s/p MI w/ PCI and stent placement, Left Breast CA s/p Lumpectomy and Chemo 4 yrs ago, Recurrent PE on Lovenox subq presents to the ED c/o intermittent LUQ abdominal pain for the past few years. Pt being admitted for intractable abdominal pain.

## 2020-11-03 ENCOUNTER — TRANSCRIPTION ENCOUNTER (OUTPATIENT)
Age: 60
End: 2020-11-03

## 2020-11-03 VITALS
DIASTOLIC BLOOD PRESSURE: 72 MMHG | OXYGEN SATURATION: 98 % | HEART RATE: 68 BPM | SYSTOLIC BLOOD PRESSURE: 127 MMHG | TEMPERATURE: 98 F | RESPIRATION RATE: 17 BRPM

## 2020-11-03 LAB
ANION GAP SERPL CALC-SCNC: 5 MMOL/L — SIGNIFICANT CHANGE UP (ref 5–17)
BUN SERPL-MCNC: 7 MG/DL — SIGNIFICANT CHANGE UP (ref 7–23)
CALCIUM SERPL-MCNC: 8.9 MG/DL — SIGNIFICANT CHANGE UP (ref 8.5–10.1)
CHLORIDE SERPL-SCNC: 107 MMOL/L — SIGNIFICANT CHANGE UP (ref 96–108)
CO2 SERPL-SCNC: 28 MMOL/L — SIGNIFICANT CHANGE UP (ref 22–31)
CREAT SERPL-MCNC: 0.81 MG/DL — SIGNIFICANT CHANGE UP (ref 0.5–1.3)
GLUCOSE SERPL-MCNC: 124 MG/DL — HIGH (ref 70–99)
HCT VFR BLD CALC: 38.9 % — SIGNIFICANT CHANGE UP (ref 34.5–45)
HGB BLD-MCNC: 12.4 G/DL — SIGNIFICANT CHANGE UP (ref 11.5–15.5)
MAGNESIUM SERPL-MCNC: 1.9 MG/DL — SIGNIFICANT CHANGE UP (ref 1.6–2.6)
MCHC RBC-ENTMCNC: 26.3 PG — LOW (ref 27–34)
MCHC RBC-ENTMCNC: 31.9 GM/DL — LOW (ref 32–36)
MCV RBC AUTO: 82.6 FL — SIGNIFICANT CHANGE UP (ref 80–100)
NRBC # BLD: 0 /100 WBCS — SIGNIFICANT CHANGE UP (ref 0–0)
PHOSPHATE SERPL-MCNC: 2.7 MG/DL — SIGNIFICANT CHANGE UP (ref 2.5–4.5)
PLATELET # BLD AUTO: 268 K/UL — SIGNIFICANT CHANGE UP (ref 150–400)
POTASSIUM SERPL-MCNC: 3.9 MMOL/L — SIGNIFICANT CHANGE UP (ref 3.5–5.3)
POTASSIUM SERPL-SCNC: 3.9 MMOL/L — SIGNIFICANT CHANGE UP (ref 3.5–5.3)
RBC # BLD: 4.71 M/UL — SIGNIFICANT CHANGE UP (ref 3.8–5.2)
RBC # FLD: 14.3 % — SIGNIFICANT CHANGE UP (ref 10.3–14.5)
SARS-COV-2 IGG SERPL QL IA: NEGATIVE — SIGNIFICANT CHANGE UP
SARS-COV-2 IGM SERPL IA-ACNC: <0.1 INDEX — SIGNIFICANT CHANGE UP
SODIUM SERPL-SCNC: 140 MMOL/L — SIGNIFICANT CHANGE UP (ref 135–145)
WBC # BLD: 6.51 K/UL — SIGNIFICANT CHANGE UP (ref 3.8–10.5)
WBC # FLD AUTO: 6.51 K/UL — SIGNIFICANT CHANGE UP (ref 3.8–10.5)

## 2020-11-03 PROCEDURE — 99232 SBSQ HOSP IP/OBS MODERATE 35: CPT

## 2020-11-03 RX ADMIN — MYCOPHENOLATE MOFETIL 1000 MILLIGRAM(S): 250 CAPSULE ORAL at 05:54

## 2020-11-03 RX ADMIN — Medication 10 MILLIGRAM(S): at 08:00

## 2020-11-03 RX ADMIN — MYCOPHENOLATE MOFETIL 1000 MILLIGRAM(S): 250 CAPSULE ORAL at 18:30

## 2020-11-03 RX ADMIN — POLYETHYLENE GLYCOL 3350 17 GRAM(S): 17 POWDER, FOR SOLUTION ORAL at 12:19

## 2020-11-03 RX ADMIN — Medication 200 MILLIGRAM(S): at 05:54

## 2020-11-03 RX ADMIN — LISINOPRIL 5 MILLIGRAM(S): 2.5 TABLET ORAL at 05:54

## 2020-11-03 RX ADMIN — Medication 1 DROP(S): at 18:28

## 2020-11-03 RX ADMIN — ENOXAPARIN SODIUM 90 MILLIGRAM(S): 100 INJECTION SUBCUTANEOUS at 05:53

## 2020-11-03 RX ADMIN — SIMETHICONE 80 MILLIGRAM(S): 80 TABLET, CHEWABLE ORAL at 18:29

## 2020-11-03 RX ADMIN — PANTOPRAZOLE SODIUM 40 MILLIGRAM(S): 20 TABLET, DELAYED RELEASE ORAL at 08:00

## 2020-11-03 RX ADMIN — Medication 1 DROP(S): at 12:20

## 2020-11-03 RX ADMIN — Medication 10 MILLIGRAM(S): at 13:19

## 2020-11-03 RX ADMIN — ENOXAPARIN SODIUM 90 MILLIGRAM(S): 100 INJECTION SUBCUTANEOUS at 18:35

## 2020-11-03 RX ADMIN — Medication 10 MILLIGRAM(S): at 18:29

## 2020-11-03 RX ADMIN — DORZOLAMIDE HYDROCHLORIDE TIMOLOL MALEATE 1 DROP(S): 20; 5 SOLUTION/ DROPS OPHTHALMIC at 05:54

## 2020-11-03 RX ADMIN — Medication 200 MILLIGRAM(S): at 18:29

## 2020-11-03 RX ADMIN — Medication 25 MILLIGRAM(S): at 05:54

## 2020-11-03 RX ADMIN — DORZOLAMIDE HYDROCHLORIDE TIMOLOL MALEATE 1 DROP(S): 20; 5 SOLUTION/ DROPS OPHTHALMIC at 18:28

## 2020-11-03 RX ADMIN — Medication 1 DROP(S): at 05:53

## 2020-11-03 RX ADMIN — SIMETHICONE 80 MILLIGRAM(S): 80 TABLET, CHEWABLE ORAL at 05:54

## 2020-11-03 NOTE — DISCHARGE NOTE PROVIDER - NSDCMRMEDTOKEN_GEN_ALL_CORE_FT
Artificial Tears ophthalmic solution: 1 drop(s) to each affected eye 4 times a day  atorvastatin 80 mg oral tablet: 1 tab(s) orally once a day  CellCept 500 mg oral tablet: 2 tab(s) orally 2 times a day  dicyclomine 10 mg oral capsule: 1 cap(s) orally 4 times a day  dorzolamide-timolol 2.23%-0.68% ophthalmic solution: 1 drop(s) to each affected eye 2 times a day  enoxaparin 100 mg/mL injectable solution: 90 milligram(s) injectable 2 times a day  hydroxychloroquine 200 mg oral tablet:  orally 2 times a day  latanoprost 0.005% ophthalmic solution: 1 drop(s) to each affected eye once a day (in the evening)  lisinopril 5 mg oral tablet: 1 tab(s) orally once a day  metoprolol tartrate 25 mg oral tablet: 25 milligram(s) orally once a day  Plavix 75 mg oral tablet: 1 tab(s) orally once a day  Protonix 40 mg oral delayed release tablet: 1 tab(s) orally once a day  senna 8.6 mg oral tablet: 1 tab(s) orally once a day (at bedtime)  Tylenol 500 mg oral tablet: 2 tab(s) orally every 6 hours, As Needed for pain

## 2020-11-03 NOTE — PROGRESS NOTE ADULT - SUBJECTIVE AND OBJECTIVE BOX
Today 11/3/2020 is my last day as patient's attending     Patient is a 60y old  Female who presents with a chief complaint of Intractable abdominal pain (01 Nov 2020 20:57)    MEDICATIONS  (STANDING):  artificial tears (preservative free) Ophthalmic Solution 1 Drop(s) Both EYES four times a day  atorvastatin 80 milliGRAM(s) Oral at bedtime  dicyclomine 10 milliGRAM(s) Oral four times a day before meals  dorzolamide 2%/timolol 0.5% Ophthalmic Solution 1 Drop(s) Both EYES two times a day  enoxaparin Injectable 90 milliGRAM(s) SubCutaneous two times a day  hydroxychloroquine 200 milliGRAM(s) Oral two times a day  latanoprost 0.005% Ophthalmic Solution 1 Drop(s) Both EYES at bedtime  lisinopril 5 milliGRAM(s) Oral daily  metoprolol tartrate 25 milliGRAM(s) Oral daily  mycophenolate mofetil 1000 milliGRAM(s) Oral two times a day  pantoprazole    Tablet 40 milliGRAM(s) Oral before breakfast  polyethylene glycol 3350 17 Gram(s) Oral daily  senna 1 Tablet(s) Oral at bedtime  simethicone 80 milliGRAM(s) Chew two times a day    MEDICATIONS  (PRN):  morphine  - Injectable 2 milliGRAM(s) IV Push every 4 hours PRN Severe Pain (7 - 10)  ondansetron Injectable 4 milliGRAM(s) IV Push every 6 hours PRN Nausea and/or Vomiting  traMADol 50 milliGRAM(s) Oral every 6 hours PRN Moderate Pain (4 - 6)      OVERNIGHT EVENTS:    Allergies    No Known Allergies    Intolerances        SUBJECTIVE: in bed in NAD, no acute events overnight       Vital Signs Last 24 Hrs  T(C): 37.1 (03 Nov 2020 06:03), Max: 37.2 (02 Nov 2020 23:46)  T(F): 98.8 (03 Nov 2020 06:03), Max: 99 (02 Nov 2020 23:46)  HR: 70 (03 Nov 2020 06:03) (64 - 70)  BP: 111/64 (03 Nov 2020 06:03) (110/65 - 143/73)  BP(mean): --  RR: 18 (03 Nov 2020 06:03) (18 - 18)  SpO2: 97% (03 Nov 2020 06:03) (96% - 100%)    PHYSICAL EXAM:  GENERAL: NAD, well-groomed, well-developed  HEAD:  Atraumatic, Normocephalic  EYES: EOMI, PERRLA, conjunctiva and sclera clear  ENMT: No tonsillar erythema, exudates, or enlargement; Moist mucous membranes, Good dentition, No lesions  NECK: Supple, No JVD, Normal thyroid  CHEST/LUNG: Clear to  auscultation bilaterally; No rales, rhonchi, wheezing, or rubs  bilaterally  HEART: Regular rate and rhythm; No murmurs, rubs, or gallops  ABDOMEN: Soft, mild luq tenderness, Nondistended; Bowel sounds present  EXTREMITIES:  2+ Peripheral Pulses, No clubbing, cyanosis, or edema BL LE  SKIN: No rashes or lesions  NERVOUS SYSTEM:  Alert & Oriented X3, Good concentration; Motor Strength 5/5 B/L upper and lower extremities;   DTRs 2+ intact and symmetric, sensation intact BL    LABS:                      Cultures;   CAPILLARY BLOOD GLUCOSE        Lipid panel:           RADIOLOGY & ADDITIONAL TESTS:    < from: MR Abdomen w/wo IV Cont (11.02.20 @ 11:14) >  Impression: Hepatic steatosis.    The portal veins and hepatic veins are patent without evidence for a thrombus. The IVC is patent without evidence for a thrombus.    < end of copied text >    Imaging Personally Reviewed:  [ x] YES      Consultant(s) Notes Reviewed:  [x ] YES     Care Discussed with [x ] Consultants [X ] Patient [x ] Family  [x ]    [x ]  Other; RN

## 2020-11-03 NOTE — PROGRESS NOTE ADULT - PROBLEM SELECTOR PLAN 7
?svc obstruction given abnormal azgos with collaterals noted on ct scan . margarita get vascula consult .   unclear what more to be done as pt already on therapeutic doses of Lovenox for h/o PE    11/3/2020 mri abdomen noted and IVC, portal and heaptic veins are  patent  without thrombus ?svc obstruction given abnormal azgos with collaterals noted on ct scan . margarita get vascula consult .   unclear what more to be done as pt already on therapeutic doses of Lovenox for h/o PE    11/3/2020 mri abdomen noted and IVC, portal and heaptic veins are  patent  without thrombus, continue with lovenox

## 2020-11-03 NOTE — DISCHARGE NOTE PROVIDER - CARE PROVIDER_API CALL
Gastroenterologist,   For EGD on thursday, 11/5/2020  Phone: (   )    -  Fax: (   )    -  Follow Up Time:     PCP,   Phone: (   )    -  Fax: (   )    -  Follow Up Time:

## 2020-11-03 NOTE — DISCHARGE NOTE NURSING/CASE MANAGEMENT/SOCIAL WORK - PATIENT PORTAL LINK FT
You can access the FollowMyHealth Patient Portal offered by [facility] by registering at the following website: [email or web address]. By joining [facility]’s FollowMyHealth portal, you will also be able to view your health information using other applications (apps) compatible with our system.

## 2020-11-03 NOTE — PROGRESS NOTE ADULT - ASSESSMENT
HPI:       61 y/o female with PMHx of Lupus, HLD, CAD s/p MI w/ PCI and stent placement, Left Breast CA s/p Lumpectomy and Chemo 4 yrs ago, Recurrent PE on Lovenox subq presents to the ED c/o intermittent LUQ abdominal pain for the past few years. Pt reports pain initial began four years ago after her lumpectomy and has occurred intermittently since then. Pt reports pain has been intermittent in the past however in the last few weeks have been more constant. Pt reports pain at times worse after eating however can be worse without food. Pain located mainly in LUQ however does radiate down to LLQ and up her left chest to her shoulders. Pt describes pain as sharp, burning and 10/10 at max. Pt also reports constipation, abdominal bloating but denies n/v. Pt denies dysuria or urinary frequency, fever or chills. Pt reports multiple hospitalizations in past and extensive work up including scans as well as a colonoscopy were all unremarkable. Pt reports she was again referred to GI recently and is scheduled for an EGD on Thursday.     In ED Vitals stable, labs stable CT A/P w/ IV contrast Dilated azygos vein with extensive varices in the anterior abdominal wall may be secondary to SVC or central venous thrombosis, especially in this patient with history of breast cancer. (01 Nov 2020 20:57)  ------------------ As Above -----------------------------------------------------------------  The patient presents with a LUQ pain for a very long time. The pain is best described as gassy / bloated. It seems that the food just sits there. It happens immediately after eating. The pain can also present without meals. Sitting up straight from a prone position or bending toward her left side causes the same pain. Finally, the pain sometimes radiates around from her left back  to her left upper quadrant. The patient states that despite different medications, she still has the pain. The pain can be constant and last for hours. It has not changed over the past few months. Constipated (+). She is on no food restrictions and does take lactose products. The patient had a " negative"  EGD and colonoscopy three years ago. The patient is scheduled for an EGD this Thursday. ( The patient is presently on Plavix and Lovenox BID )   See labs / CT scan    Abdominal pain - Probably has components of IBS and muscular skeletal origin. On Bentyl / PPI 1) EGD Thursday ( Can be kuhn as out patient - Hold Plavix if possible ) 2) lactose free diet 3) Simethicone 4) Pain management consult  5) full lquid diet HPI:       61 y/o female with PMHx of Lupus, HLD, CAD s/p MI w/ PCI and stent placement, Left Breast CA s/p Lumpectomy and Chemo 4 yrs ago, Recurrent PE on Lovenox subq presents to the ED c/o intermittent LUQ abdominal pain for the past few years. Pt reports pain initial began four years ago after her lumpectomy and has occurred intermittently since then. Pt reports pain has been intermittent in the past however in the last few weeks have been more constant. Pt reports pain at times worse after eating however can be worse without food. Pain located mainly in LUQ however does radiate down to LLQ and up her left chest to her shoulders. Pt describes pain as sharp, burning and 10/10 at max. Pt also reports constipation, abdominal bloating but denies n/v. Pt denies dysuria or urinary frequency, fever or chills. Pt reports multiple hospitalizations in past and extensive work up including scans as well as a colonoscopy were all unremarkable. Pt reports she was again referred to GI recently and is scheduled for an EGD on Thursday.     In ED Vitals stable, labs stable CT A/P w/ IV contrast Dilated azygos vein with extensive varices in the anterior abdominal wall may be secondary to SVC or central venous thrombosis, especially in this patient with history of breast cancer. (01 Nov 2020 20:57)  ------------------ As Above -----------------------------------------------------------------  The patient presents with a LUQ pain for a very long time. The pain is best described as gassy / bloated. It seems that the food just sits there. It happens immediately after eating. The pain can also present without meals. Sitting up straight from a prone position or bending toward her left side causes the same pain. Finally, the pain sometimes radiates around from her left back  to her left upper quadrant. The patient states that despite different medications, she still has the pain. The pain can be constant and last for hours. It has not changed over the past few months. Constipated (+). She is on no food restrictions and does take lactose products. The patient had a " negative"  EGD and colonoscopy three years ago. The patient is scheduled for an EGD this Thursday. ( The patient is presently on Plavix and Lovenox BID )   See labs / CT scan    Abdominal pain - Probably has components of IBS and muscular skeletal origin. On Bentyl / PPI 1) EGD Thursday ( Can be kuhn as out patient - Hold Plavix if possible ) 2) low fiber, lactose free diet 3) Simethicone 4) Pain management consult

## 2020-11-03 NOTE — PROGRESS NOTE ADULT - PROBLEM SELECTOR PLAN 1
- although chronic, has been more severe and intractable recently per patient  - pain control  - keep NPO for now,  will get GI evaluation CT scan shows heterogenous content in stomach could be food blood or ?  PPI   - c/w PPI and bentyl  - IV morphine prn w/ continued laxatives  11/3/2020 started clears advance as tolerated.  for outpt already scheduled egd on Thursday by er gi doctor .

## 2020-11-03 NOTE — PROGRESS NOTE ADULT - SUBJECTIVE AND OBJECTIVE BOX
Patient is a 60y old  Female who presents with a chief complaint of Intractable abdominal pain (03 Nov 2020 09:04)      HPI:       61 y/o female with PMHx of Lupus, HLD, CAD s/p MI w/ PCI and stent placement, Left Breast CA s/p Lumpectomy and Chemo 4 yrs ago, Recurrent PE on Lovenox subq presents to the ED c/o intermittent LUQ abdominal pain for the past few years. Pt reports pain initial began four years ago after her lumpectomy and has occurred intermittently since then. Pt reports pain has been intermittent in the past however in the last few weeks have been more constant. Pt reports pain at times worse after eating however can be worse without food. Pain located mainly in LUQ however does radiate down to LLQ and up her left chest to her shoulders. Pt describes pain as sharp, burning and 10/10 at max. Pt also reports constipation, abdominal bloating but denies n/v. Pt denies dysuria or urinary frequency, fever or chills. Pt reports multiple hospitalizations in past and extensive work up including scans as well as a colonoscopy were all unremarkable. Pt reports she was again referred to GI recently and is scheduled for an EGD on Thursday.     In ED Vitals stable, labs stable CT A/P w/ IV contrast Dilated azygos vein with extensive varices in the anterior abdominal wall may be secondary to SVC or central venous thrombosis, especially in this patient with history of breast cancer. (01 Nov 2020 20:57)      INTERVAL HPI/OVERNIGHT EVENTS:  Abdominal pain constant through out the day. Worse when bending foreword or onto left lateral. No N/V. Hungry (?)  Sometimes pain is in left posterior axillary line and radiates around.      MEDICATIONS  (STANDING):  artificial tears (preservative free) Ophthalmic Solution 1 Drop(s) Both EYES four times a day  atorvastatin 80 milliGRAM(s) Oral at bedtime  dicyclomine 10 milliGRAM(s) Oral four times a day before meals  dorzolamide 2%/timolol 0.5% Ophthalmic Solution 1 Drop(s) Both EYES two times a day  enoxaparin Injectable 90 milliGRAM(s) SubCutaneous two times a day  hydroxychloroquine 200 milliGRAM(s) Oral two times a day  latanoprost 0.005% Ophthalmic Solution 1 Drop(s) Both EYES at bedtime  lisinopril 5 milliGRAM(s) Oral daily  metoprolol tartrate 25 milliGRAM(s) Oral daily  mycophenolate mofetil 1000 milliGRAM(s) Oral two times a day  pantoprazole    Tablet 40 milliGRAM(s) Oral before breakfast  polyethylene glycol 3350 17 Gram(s) Oral daily  senna 1 Tablet(s) Oral at bedtime  simethicone 80 milliGRAM(s) Chew two times a day    MEDICATIONS  (PRN):  morphine  - Injectable 2 milliGRAM(s) IV Push every 4 hours PRN Severe Pain (7 - 10)  ondansetron Injectable 4 milliGRAM(s) IV Push every 6 hours PRN Nausea and/or Vomiting  traMADol 50 milliGRAM(s) Oral every 6 hours PRN Moderate Pain (4 - 6)      FAMILY HISTORY:  No pertinent family history in first degree relatives        Allergies    No Known Allergies    Intolerances        PMH/PSH:  Coronary artery disease    Hypertension    Lupus    Breast cancer    DM (diabetes mellitus)    H/O breast surgery          REVIEW OF SYSTEMS:  CONSTITUTIONAL: No fever, weight loss, or fatigue  EYES: No eye pain, visual disturbances, or discharge  ENMT:  No difficulty hearing, tinnitus, vertigo; No sinus or throat pain  NECK: No pain or stiffness  BREASTS: No pain, masses, or nipple discharge  RESPIRATORY: No cough, wheezing, chills or hemoptysis; No shortness of breath  CARDIOVASCULAR: No chest pain, palpitations, dizziness, or leg swelling  GASTROINTESTINAL:  See above  GENITOURINARY: No dysuria, frequency, hematuria, or incontinence  NEUROLOGICAL: No headaches, memory loss, loss of strength, numbness, or tremors  SKIN: No itching, burning, rashes, or lesions   LYMPH NODES: No enlarged glands  ENDOCRINE: No heat or cold intolerance; No hair loss  MUSCULOSKELETAL: No joint pain or swelling; No muscle, back, or extremity pain  PSYCHIATRIC: No depression, anxiety, mood swings, or difficulty sleeping  HEME/LYMPH: No easy bruising, or bleeding gums  ALLERGY AND IMMUNOLOGIC: No hives or eczema    Vital Signs Last 24 Hrs  T(C): 36.7 (03 Nov 2020 11:52), Max: 37.2 (02 Nov 2020 23:46)  T(F): 98 (03 Nov 2020 11:52), Max: 99 (02 Nov 2020 23:46)  HR: 67 (03 Nov 2020 11:52) (64 - 70)  BP: 125/60 (03 Nov 2020 11:52) (110/65 - 125/65)  BP(mean): --  RR: 17 (03 Nov 2020 11:52) (17 - 18)  SpO2: 99% (03 Nov 2020 11:52) (96% - 100%)    PHYSICAL EXAM:  GENERAL: NAD, well-groomed, well-developed  HEAD:  Atraumatic, Normocephalic  EYES: EOMI, PERRLA, conjunctiva and sclera clear  NECK: Supple, No JVD, Normal thyroid  NERVOUS SYSTEM:  Alert & Oriented X3, Good concentration; Motor Strength 5/5 B/L upper and lower extremities;   CHEST/LUNG: Clear to percussion bilaterally; No rales, rhonchi, wheezing, or rubs  HEART: Regular rate and rhythm; No murmurs, rubs, or gallops  ABDOMEN: Soft, Nontender, Nondistended; Bowel sounds present. [place] (+)  EXTREMITIES:  2+ Peripheral Pulses, No clubbing, cyanosis, or edema  LYMPH: No lymphadenopathy noted  SKIN: No rashes or lesions    LAB  11-01 @ 17:49  amylase --   lipase 90                           12.4   6.51  )-----------( 268      ( 03 Nov 2020 09:43 )             38.9       CBC:  11-03 @ 09:43  WBC 6.51   Hgb 12.4   Hct 38.9   Plts 268  MCV 82.6  11-01 @ 17:49  WBC 8.34   Hgb 12.8   Hct 39.6   Plts 280  MCV 81.5      Chemistry:  11-03 @ 09:43  Na+ 140  K+ 3.9  Cl- 107  CO2 28  BUN 7  Cr 0.81     11-01 @ 17:49  Na+ 140  K+ 4.0  Cl- 109  CO2 25  BUN 10  Cr 0.91         Glucose, Serum: 124 mg/dL (11-03 @ 09:43)  Glucose, Serum: 174 mg/dL (11-01 @ 17:49)      03 Nov 2020 09:43    140    |  107    |  7      ----------------------------<  124    3.9     |  28     |  0.81   01 Nov 2020 17:49    140    |  109    |  10     ----------------------------<  174    4.0     |  25     |  0.91     Ca    8.9        03 Nov 2020 09:43  Ca    9.0        01 Nov 2020 17:49  Phos  2.7       03 Nov 2020 09:43  Mg     1.9       03 Nov 2020 09:43  Mg     2.1       01 Nov 2020 17:49    TPro  7.6    /  Alb  3.2    /  TBili  0.2    /  DBili  x      /  AST  17     /  ALT  27     /  AlkPhos  86     01 Nov 2020 17:49      PT/INR - ( 01 Nov 2020 17:49 )   PT: 12.8 sec;   INR: 1.11 ratio         PTT - ( 01 Nov 2020 17:49 )  PTT:43.1 sec        CAPILLARY BLOOD GLUCOSE              RADIOLOGY & ADDITIONAL TESTS:    Imaging Personally Reviewed:  [ ] YES  [ ] NO    Consultant(s) Notes Reviewed:  [ ] YES  [ ] NO    Care Discussed with Consultants/Other Providers [ ] YES  [ ] NO

## 2020-11-03 NOTE — DISCHARGE NOTE PROVIDER - HOSPITAL COURSE
Patient is a 61 y/o female with PMHx of Lupus, HLD, CAD s/p MI w/ PCI and stent placement, Left Breast CA s/p Lumpectomy and Chemo 4 yrs ago, Recurrent PE on Lovenox subq presents to the ED c/o intermittent LUQ abdominal pain for the past few years. Pt was admitted for intractable abdominal pain.     Problem/Plan - 1:  ·  Problem: Abdominal pain, unspecified abdominal location.  Plan: - although chronic, has been more severe and intractable recently per patient  - pain control  -  CT scan shows heterogenous content in stomach, GI consulted.   - c/w PPI and bentyl  - IV morphine prn w/ continued laxatives  11/3/2020 per GI, patient's pain can be due to components of IBS and muscular skeletal origin.   Advanced diet to regular diet and tolerated well.  Denies any nausea or vomiting.  Pt has an EGD scheduled with outpatient GI doctor.     Problem/Plan - 2:  ·  Problem: Chronic pulmonary embolism, unspecified pulmonary embolism type, unspecified whether acute cor pulmonale present.  Plan: - Pt reports she was previously on Coumadin for 10 yrs, then developed another clot 2 yrs ago thus was placed in Lovenox subq which has been on since then   will continue.      Problem/Plan - 3:  ·  Problem: Lupus.  Plan: - c/w Plaquenil.      Problem/Plan - 4:  ·  Problem: Hypertension, unspecified type.  Plan: - c/w BB and lisinopril.      Problem/Plan - 5:  ·  Problem: Coronary artery disease involving native heart without angina pectoris, unspecified vessel or lesion type.  Plan: - c/w Plavix and statin.      Problem/Plan - 6:  Problem: DVT prophylaxis. Plan: - pt on full dose Lovenox.     Problem/Plan - 7:  ·  Problem: SVC (superior vena cava obstruction).  Plan: Questionable svc obstruction given abnormal azgos with collaterals noted on ct scan .  11/3/2020 mri abdomen noted and IVC, portal and hepatic veins are  patent  without thrombus, continue with lovenox.    Patient is clinically stable for discharge.  To follow up with outpatient GI doctor as scheduled on Thursday, 11/5/2020 for EGD. Patient is a 59 y/o female with PMHx of Lupus, HLD, CAD s/p MI w/ PCI and stent placement, Left Breast CA s/p Lumpectomy and Chemo 4 yrs ago, Recurrent PE on Lovenox subq presents to the ED c/o intermittent LUQ abdominal pain for the past few years. Pt was admitted for intractable abdominal pain.     Problem/Plan - 1:  ·  Problem: Abdominal pain, unspecified abdominal location.  Plan: - although chronic, has been more severe and intractable recently per patient  - pain control  -  CT scan shows heterogenous content in stomach, GI consulted.   - c/w PPI and bentyl  - IV morphine prn w/ continued laxatives  - Pain improved with treatment.  11/3/2020 per GI, patient's pain can be due to components of IBS and muscular skeletal origin.   Advanced diet to regular diet and tolerated well.  Denies any nausea or vomiting.  Pt has an EGD scheduled with outpatient GI doctor.     Problem/Plan - 2:  ·  Problem: Chronic pulmonary embolism, unspecified pulmonary embolism type, unspecified whether acute cor pulmonale present.  Plan: - Pt reports she was previously on Coumadin for 10 yrs, then developed another clot 2 yrs ago thus was placed in Lovenox subq which has been on since then   will continue.      Problem/Plan - 3:  ·  Problem: Lupus.  Plan: - c/w Plaquenil.      Problem/Plan - 4:  ·  Problem: Hypertension, unspecified type.  Plan: - c/w BB and lisinopril.      Problem/Plan - 5:  ·  Problem: Coronary artery disease involving native heart without angina pectoris, unspecified vessel or lesion type.  Plan: - c/w Plavix and statin.      Problem/Plan - 6:  Problem: DVT prophylaxis. Plan: - pt on full dose Lovenox.     Problem/Plan - 7:  ·  Problem: SVC (superior vena cava obstruction).  Plan: Questionable svc obstruction given abnormal azgos with collaterals noted on ct scan .  11/3/2020 mri abdomen noted and IVC, portal and hepatic veins are  patent  without thrombus, continue with lovenox.    Patient is clinically stable for discharge.  To follow up with outpatient GI doctor as scheduled on Thursday, 11/5/2020 for EGD.

## 2020-11-03 NOTE — DISCHARGE NOTE PROVIDER - PROVIDER TOKENS
FREE:[LAST:[Gastroenterologist],PHONE:[(   )    -],FAX:[(   )    -],ADDRESS:[For EGD on thursday, 11/5/2020]],FREE:[LAST:[PCP],PHONE:[(   )    -],FAX:[(   )    -]]

## 2020-11-03 NOTE — DISCHARGE NOTE PROVIDER - NSDCCPCAREPLAN_GEN_ALL_CORE_FT
PRINCIPAL DISCHARGE DIAGNOSIS  Diagnosis: Abdominal pain, unspecified abdominal location  Assessment and Plan of Treatment: Follow up with GI outpatient for EGD      SECONDARY DISCHARGE DIAGNOSES  Diagnosis: SVC (superior vena cava obstruction)  Assessment and Plan of Treatment: Continue with home meds    Diagnosis: Hypertension, unspecified type  Assessment and Plan of Treatment: Continue with home meds    Diagnosis: Coronary artery disease involving native heart without angina pectoris, unspecified vessel or lesion type  Assessment and Plan of Treatment: Continue with home meds    Diagnosis: Lupus  Assessment and Plan of Treatment: Continue with home meds

## 2020-11-15 DIAGNOSIS — I27.82 CHRONIC PULMONARY EMBOLISM: ICD-10-CM

## 2020-11-15 DIAGNOSIS — E78.5 HYPERLIPIDEMIA, UNSPECIFIED: ICD-10-CM

## 2020-11-15 DIAGNOSIS — Z92.21 PERSONAL HISTORY OF ANTINEOPLASTIC CHEMOTHERAPY: ICD-10-CM

## 2020-11-15 DIAGNOSIS — Z79.01 LONG TERM (CURRENT) USE OF ANTICOAGULANTS: ICD-10-CM

## 2020-11-15 DIAGNOSIS — E11.9 TYPE 2 DIABETES MELLITUS WITHOUT COMPLICATIONS: ICD-10-CM

## 2020-11-15 DIAGNOSIS — R10.12 LEFT UPPER QUADRANT PAIN: ICD-10-CM

## 2020-11-15 DIAGNOSIS — I25.10 ATHEROSCLEROTIC HEART DISEASE OF NATIVE CORONARY ARTERY WITHOUT ANGINA PECTORIS: ICD-10-CM

## 2020-11-15 DIAGNOSIS — Z95.5 PRESENCE OF CORONARY ANGIOPLASTY IMPLANT AND GRAFT: ICD-10-CM

## 2020-11-15 DIAGNOSIS — I25.2 OLD MYOCARDIAL INFARCTION: ICD-10-CM

## 2020-11-15 DIAGNOSIS — Z85.3 PERSONAL HISTORY OF MALIGNANT NEOPLASM OF BREAST: ICD-10-CM

## 2020-11-15 DIAGNOSIS — K58.1 IRRITABLE BOWEL SYNDROME WITH CONSTIPATION: ICD-10-CM

## 2020-11-15 DIAGNOSIS — K76.0 FATTY (CHANGE OF) LIVER, NOT ELSEWHERE CLASSIFIED: ICD-10-CM

## 2020-11-15 DIAGNOSIS — M32.9 SYSTEMIC LUPUS ERYTHEMATOSUS, UNSPECIFIED: ICD-10-CM

## 2021-03-22 NOTE — H&P ADULT - PROBLEM/PLAN-3
Had u/s completed ok per [facility] to proceed with Mirena will call pt back to review if she checked her benefits and to schedule 
Label made for patient for Mirena on 4/5/21 - Spoke with you about having no more Mirena's in office currently 3/22/21
Spoke with patient, states she spoke with someone at her insurance and is covered  Did not remember to get name or reference number for the call  Patient aware can call back and obtain that information if she would like and then can document in her chart  Patient states she confident in the information given to her by her insurance company  Apt scheduled for 4/5 in [place]  Aware if period schedule changes, late, early etc to call and can reschedule apt 
DISPLAY PLAN FREE TEXT